# Patient Record
Sex: MALE | Race: WHITE | NOT HISPANIC OR LATINO
[De-identification: names, ages, dates, MRNs, and addresses within clinical notes are randomized per-mention and may not be internally consistent; named-entity substitution may affect disease eponyms.]

---

## 2021-07-23 ENCOUNTER — APPOINTMENT (OUTPATIENT)
Dept: PAIN MANAGEMENT | Facility: CLINIC | Age: 76
End: 2021-07-23
Payer: MEDICARE

## 2021-07-23 DIAGNOSIS — Z87.39 PERSONAL HISTORY OF OTHER DISEASES OF THE MUSCULOSKELETAL SYSTEM AND CONNECTIVE TISSUE: ICD-10-CM

## 2021-07-23 PROBLEM — Z00.00 ENCOUNTER FOR PREVENTIVE HEALTH EXAMINATION: Status: ACTIVE | Noted: 2021-07-23

## 2021-07-23 PROCEDURE — 99204 OFFICE O/P NEW MOD 45 MIN: CPT

## 2021-07-23 NOTE — ASSESSMENT
[FreeTextEntry1] : 75 yom s/p right hip replacement 16 years ago and left hip replacement 5 years ago for consultation w/ right hip pain. Pain became severe six weeks ago. Quality of life is impaired. \par \par Physical therapy - increase ROM, strengthening, postural training, other modalities ad danica. I do believe that his pain may improve with conservative care.\par \par If pain persists we will plan for right hip intraarticular steroid injection under fluoroscopic guidance.\par \par Acetaminophen prn.
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
## 2021-07-23 NOTE — PHYSICAL EXAM

## 2021-08-27 ENCOUNTER — RESULT REVIEW (OUTPATIENT)
Age: 76
End: 2021-08-27

## 2021-08-30 ENCOUNTER — APPOINTMENT (OUTPATIENT)
Dept: PAIN MANAGEMENT | Facility: HOSPITAL | Age: 76
End: 2021-08-30

## 2021-08-30 ENCOUNTER — RESULT REVIEW (OUTPATIENT)
Age: 76
End: 2021-08-30

## 2021-09-10 ENCOUNTER — APPOINTMENT (OUTPATIENT)
Dept: PAIN MANAGEMENT | Facility: CLINIC | Age: 76
End: 2021-09-10
Payer: MEDICARE

## 2021-09-10 DIAGNOSIS — M25.551 PAIN IN RIGHT HIP: ICD-10-CM

## 2021-09-10 DIAGNOSIS — M25.552 PAIN IN RIGHT HIP: ICD-10-CM

## 2021-09-10 DIAGNOSIS — G89.29 PAIN IN RIGHT HIP: ICD-10-CM

## 2021-09-10 PROCEDURE — 99214 OFFICE O/P EST MOD 30 MIN: CPT

## 2021-09-10 NOTE — PHYSICAL EXAM

## 2021-09-10 NOTE — ASSESSMENT
[FreeTextEntry1] : 75 yom s/p right hip replacement 16 years ago and left hip replacement 5 years ago for follow-up after hip injection. He is feeling better.\par \par I have again reviewed his imaging of the hip which does not reveal any hardware related issues.\par \par Physical therapy - increase ROM, strengthening, postural training, other modalities ad danica. I do believe that his pain may improve with conservative care.\par \par Acetaminophen prn. \par \par No NSAIDS due to renal issues.\par \par He will followup with his hip surgeon Dr. Castellanos.
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
